# Patient Record
Sex: MALE | Race: AMERICAN INDIAN OR ALASKA NATIVE
[De-identification: names, ages, dates, MRNs, and addresses within clinical notes are randomized per-mention and may not be internally consistent; named-entity substitution may affect disease eponyms.]

---

## 2018-04-18 ENCOUNTER — HOSPITAL ENCOUNTER (EMERGENCY)
Dept: HOSPITAL 5 - ED | Age: 48
Discharge: HOME | End: 2018-04-18
Payer: SELF-PAY

## 2018-04-18 VITALS — DIASTOLIC BLOOD PRESSURE: 85 MMHG | SYSTOLIC BLOOD PRESSURE: 136 MMHG

## 2018-04-18 DIAGNOSIS — I10: ICD-10-CM

## 2018-04-18 DIAGNOSIS — Z20.828: Primary | ICD-10-CM

## 2018-04-18 DIAGNOSIS — F12.10: ICD-10-CM

## 2018-04-18 DIAGNOSIS — I50.9: ICD-10-CM

## 2018-04-18 DIAGNOSIS — Z88.8: ICD-10-CM

## 2018-04-18 PROCEDURE — 99282 EMERGENCY DEPT VISIT SF MDM: CPT

## 2018-04-18 NOTE — EMERGENCY DEPARTMENT REPORT
ED Male  HPI





- General


Chief complaint: Medical Clearance


Stated complaint: STD TEST


Time Seen by Provider: 04/18/18 14:05


Source: patient


Mode of arrival: Ambulatory


Limitations: No Limitations





- History of Present Illness


Initial comments: 


47-year-old male past medical history CHF presents with complaint of exposure 

to Trichomonas.  Patient denies any fevers chills is asymptomatic.  Has no 

penile discharge or dysuria.  Patient's partner confirms that she had 

Trichomonas and was treated for this week.


Improves with: none


Worsens with: none


denies other symptoms





- Related Data


 Previous Rx's











 Medication  Instructions  Recorded  Last Taken  Type


 


metroNIDAZOLE [Metronidazole] 500 mg PO BID #4 tablet 04/18/18 Unknown Rx











 Allergies











Allergy/AdvReac Type Severity Reaction Status Date / Time


 


carvedilol [From Coreg] Allergy  Unknown Verified 04/18/18 11:45














ED Review of Systems


ROS: 


Stated complaint: STD TEST


Other details as noted in HPI





Constitutional: denies: chills, fever


Eyes: denies: eye pain, eye discharge, vision change


ENT: denies: ear pain, throat pain


Respiratory: denies: cough, shortness of breath, wheezing


Cardiovascular: denies: chest pain, palpitations


Endocrine: no symptoms reported


Gastrointestinal: denies: abdominal pain, nausea, diarrhea


Genitourinary: denies: urgency, dysuria


Musculoskeletal: denies: back pain, joint swelling, arthralgia


Skin: denies: rash, lesions


Neurological: denies: headache, weakness, paresthesias


Psychiatric: denies: anxiety, depression


Hematological/Lymphatic: denies: easy bleeding, easy bruising





ED Past Medical Hx





- Past Medical History


Hx Hypertension: Yes


Hx Congestive Heart Failure: Yes





- Surgical History


Past Surgical History?: No





- Social History


Smoking Status: Never Smoker


Substance Use Type: Alcohol, Marijuana





- Medications


Home Medications: 


 Home Medications











 Medication  Instructions  Recorded  Confirmed  Last Taken  Type


 


metroNIDAZOLE [Metronidazole] 500 mg PO BID #4 tablet 04/18/18  Unknown Rx














ED Physical Exam





- General


Limitations: No Limitations


General appearance: alert, in no apparent distress





- Head


Head exam: Present: atraumatic, normocephalic





- Eye


Eye exam: Present: normal appearance





- ENT


ENT exam: Present: mucous membranes moist





- Neck


Neck exam: Present: normal inspection





- Respiratory


Respiratory exam: Present: normal lung sounds bilaterally.  Absent: respiratory 

distress





- Cardiovascular


Cardiovascular Exam: Present: regular rate, normal rhythm.  Absent: systolic 

murmur, diastolic murmur, rubs, gallop





- GI/Abdominal


GI/Abdominal exam: Present: soft, normal bowel sounds





- Rectal


Rectal exam: Present: deferred





- Extremities Exam


Extremities exam: Present: normal inspection





- Back Exam


Back exam: Present: normal inspection





- Neurological Exam


Neurological exam: Present: alert, oriented X3





- Psychiatric


Psychiatric exam: Present: normal affect, normal mood





- Skin


Skin exam: Present: warm, dry, intact, normal color.  Absent: rash





ED Course


 Vital Signs











  04/18/18





  11:46


 


Temperature 98.4 F


 


Pulse Rate 75


 


Respiratory 18





Rate 


 


Blood Pressure 136/85


 


O2 Sat by Pulse 100





Oximetry 














ED Medical Decision Making





- Medical Decision Making


A/P: Exposure to Trichomonas


1- 1g twice a day for 2 doses Rx


2- f/u with pmd


 


Critical care attestation.: 


If time is entered above; I have spent that time in minutes in the direct care 

of this critically ill patient, excluding procedure time.








ED Disposition


Clinical Impression: 


 Exposure to trichomonas





Disposition: DC-01 TO HOME OR SELFCARE


Is pt being admited?: No


Does the pt Need Aspirin: No


Condition: Stable


Instructions:  Trichomoniasis (ED)


Prescriptions: 


metroNIDAZOLE [Metronidazole] 500 mg PO BID #4 tablet


Referrals: 


HARRY CAT MD [Primary Care Provider] - 3-5 Days


[facility] [Provider Group] - 3-5 Days


Time of Disposition: 14:18